# Patient Record
Sex: MALE | Race: WHITE | NOT HISPANIC OR LATINO | Employment: OTHER | URBAN - METROPOLITAN AREA
[De-identification: names, ages, dates, MRNs, and addresses within clinical notes are randomized per-mention and may not be internally consistent; named-entity substitution may affect disease eponyms.]

---

## 2017-01-04 ENCOUNTER — OFFICE VISIT (OUTPATIENT)
Dept: FAMILY MEDICINE CLINIC | Facility: CLINIC | Age: 82
End: 2017-01-04

## 2017-01-04 VITALS
BODY MASS INDEX: 25.18 KG/M2 | RESPIRATION RATE: 16 BRPM | SYSTOLIC BLOOD PRESSURE: 128 MMHG | DIASTOLIC BLOOD PRESSURE: 80 MMHG | TEMPERATURE: 97.5 F | HEART RATE: 60 BPM | HEIGHT: 66 IN | WEIGHT: 156.7 LBS | OXYGEN SATURATION: 94 %

## 2017-01-04 DIAGNOSIS — J06.9 VIRAL URI: Primary | ICD-10-CM

## 2017-01-04 PROCEDURE — 99213 OFFICE O/P EST LOW 20 MIN: CPT | Performed by: FAMILY MEDICINE

## 2017-01-04 NOTE — PROGRESS NOTES
"Subjective   Ky Michael is a 89 y.o. male.     Chief Complaint   Patient presents with   • Cough        History of Present Illness    3 or 4 days of cold symptoms.  Cough.  A little bothersome.  No fevers.  No chills.  No shortness of breath.  No other symptoms other than some congestion.  Using some over-the-counter remedies.  They are not sure if he's previously had a pneumonia vaccination.  Mild to moderate symptoms.      The following portions of the patient's history were reviewed and updated as appropriate: allergies, current medications, past family history, past medical history, past social history, past surgical history and problem list.          Review of Systems   Constitutional: Negative for fatigue and fever.   HENT: Positive for congestion.    Respiratory: Positive for cough.    Gastrointestinal: Negative.    Skin: Negative for rash.       Objective   Blood pressure 128/80, pulse 60, temperature 97.5 °F (36.4 °C), temperature source Oral, resp. rate 16, height 66\" (167.6 cm), weight 156 lb 11.2 oz (71.1 kg), SpO2 94 %.  Physical Exam   Constitutional: No distress.   No acute distress.  Nontoxic.   HENT:   Right Ear: Tympanic membrane, external ear and ear canal normal.   Left Ear: Tympanic membrane, external ear and ear canal normal.   Nose: Nose normal.   Mouth/Throat: Oropharynx is clear and moist. No oropharyngeal exudate.   Eyes: Conjunctivae are normal. Right eye exhibits no discharge. Left eye exhibits no discharge. No scleral icterus.   Cardiovascular: Normal rate.    Pulmonary/Chest: Effort normal and breath sounds normal. No stridor. No respiratory distress. He has no wheezes. He has no rales.   No tachypnea   Lymphadenopathy:     He has no cervical adenopathy.   Skin: No rash noted.   Nursing note and vitals reviewed.      Assessment/Plan   Ky was seen today for cough.    Diagnoses and all orders for this visit:    Viral URI    Very probable viral URI.  Recommend over-the-counter " Robitussin, plain.  I am concerned about risk of pneumonia given his age.  That said no current clinical evidence of pneumonia.  I did give the family a prescription for a Z-Alphonse, handwritten prescription.  Start with fever, worsening symptoms, reactive cough, and then let us know.  With severe symptoms seek medical attention immediately.  I recommend he return for wellness visit in the near future and be evaluated for immunization status.

## 2017-01-04 NOTE — MR AVS SNAPSHOT
Ky Michael   2017 1:00 PM   Office Visit    Dept Phone:  [phone]   Encounter #:  [id number]    Provider:  Jean Paul Estrada MD   Department:  [facility] PRIMARY CARE                Your Full Care Plan              Your Updated Medication List          This list is accurate as of: 17  2:39 PM.  Always use your most recent med list.                metoprolol succinate XL 25 MG 24 hr tablet   Commonly known as:  TOPROL-XL   Take 1 tablet by mouth Daily.       temazepam 30 MG capsule   Commonly known as:  RESTORIL   TAKE 1 CAPSULE BY MOUTH AT BEDTIME AS NEEDED FOR SLEEP               You Were Diagnosed With        Codes Comments    Viral URI    -  Primary ICD-10-CM: J06.9, B97.89  ICD-9-CM: 465.9       Instructions     None    Patient Instructions History      Upcoming Appointments     Visit Type Date Time Department    ACUTE           2017  1:00 PM [facility]    FOLLOW UP 3/14/2017 10:20 AM [facility]      [employer] Signup     [facility] [employer] allows you to send messages to your doctor, view your test results, renew your prescriptions, schedule appointments, and more. To sign up, go to [employer] and click on the Sign Up Now link in the New User? box. Enter your [employer] Activation Code exactly as it appears below along with the last four digits of your Social Security Number and your Date of Birth () to complete the sign-up process. If you do not sign up before the expiration date, you must request a new code.    [employer] Activation Code: [email or web address]  Expires: 2017  2:39 PM    If you have questions, you can email [email or web address] or call [phone] to talk to our [employer] staff. Remember, [employer] is NOT to be used for urgent needs. For medical emergencies, dial 911.               Other Info from Your Visit           Your Appointments     Mar 14, 2017 10:20 AM EDT   Follow Up with Court Masterson MD    "  [facility] CARDIOLOGY (--)    [address]  [facility] [address] [phone]           Arrive 15 minutes prior to appointment.              Allergies     No Known Allergies      Reason for Visit     Cough           Vital Signs     Blood Pressure Pulse Temperature Respirations Height Weight    128/80 60 97.5 °F (36.4 °C) (Oral) 16 66\" (167.6 cm) 156 lb 11.2 oz (71.1 kg)    Oxygen Saturation Body Mass Index Smoking Status             94% 25.29 kg/m2 Former Smoker         Problems and Diagnoses Noted     Viral upper respiratory tract infection    -  Primary        "

## 2017-03-14 ENCOUNTER — TELEPHONE (OUTPATIENT)
Dept: CARDIOLOGY | Facility: CLINIC | Age: 82
End: 2017-03-14

## 2017-03-14 ENCOUNTER — OFFICE VISIT (OUTPATIENT)
Dept: CARDIOLOGY | Facility: CLINIC | Age: 82
End: 2017-03-14

## 2017-03-14 VITALS
WEIGHT: 152 LBS | BODY MASS INDEX: 24.43 KG/M2 | HEIGHT: 66 IN | DIASTOLIC BLOOD PRESSURE: 80 MMHG | SYSTOLIC BLOOD PRESSURE: 170 MMHG | HEART RATE: 70 BPM

## 2017-03-14 DIAGNOSIS — I25.10 CORONARY ARTERY DISEASE INVOLVING NATIVE CORONARY ARTERY OF NATIVE HEART WITHOUT ANGINA PECTORIS: ICD-10-CM

## 2017-03-14 DIAGNOSIS — I10 ESSENTIAL HYPERTENSION: Primary | ICD-10-CM

## 2017-03-14 DIAGNOSIS — I35.0 NONRHEUMATIC AORTIC VALVE STENOSIS: ICD-10-CM

## 2017-03-14 PROCEDURE — 99214 OFFICE O/P EST MOD 30 MIN: CPT | Performed by: INTERNAL MEDICINE

## 2017-03-14 PROCEDURE — 93000 ELECTROCARDIOGRAM COMPLETE: CPT | Performed by: INTERNAL MEDICINE

## 2017-03-14 NOTE — PROGRESS NOTES
Date of Office Visit: 2017  Encounter Provider: Court Masterson MD  Place of Service: [facility] CARDIOLOGY  Patient Name: Ky Michael  :1925    Chief complaint  Follow-up of coronary artery disease, aortic valve stenosis    History of Present Illness  Patient is a 89-year-old gentleman with history of mild aortic valve stenosis is moderate pulmonary hypertension and coronary artery disease.  In  he presented with severe chest pain both atypical and anginal a stress perfusion study was abnormal.  Cardiac catheterization revealed severe disease of the right coronary artery for which he received drug coated stenting.  This is at the left main with normal fractional flow reserve and this was untreated.  He has continued to have intermittent atypical chest pain but no anginal pain since then.  He also has mild aortic valve stenosis.  His last echocardiogram performed revealed normal systolic function, grade 1A diastolic dysfunction aortic valve thickening with mild borderline stenosis with a mean gradient of 13 mmHg valve area of 1.4 cm².  His RV systolic pressure was normal.  2016 he was seen for episodic weakness.  He had a 24-hour Holter revealed sinus rhythm with frequent PACs and PVCs.  He had a Lexiscan perfusion study that was negative for ischemia.  Toprol was increased    Since last visit he feels well and denies any chest pain, palpitations syncope near syncope.  His chronic dyspnea on exertion is unchanged.  There have not been checking blood pressure consistently.    Past Medical History   Diagnosis Date   • Acute sinusitis    • Allergic    • Allergic rhinitis    • Anemia    • Aortic valve stenosis    • ASCVD (arteriosclerotic cardiovascular disease)    • Atypical chest pain    • Bronchospasm    • Bronchospasm    • Cervical neuritis    • Chest pain    • Chronic laryngitis    • Conductive hearing loss of both ears    • Cough    • Depression    •  Diastolic dysfunction    • Dry skin    • Dyslipidemia    • Dysphonia    • Folliculitis of nose    • GERD (gastroesophageal reflux disease)    • Insomnia    • Insomnia    • Left ventricular hypertrophy    • Lumbar radiculopathy    • Mitral valve insufficiency    • Muscular aches    • Pulmonary fibrosis    • Pulmonary hypertension    • SVT (supraventricular tachycardia)    • Vasomotor rhinitis    • Viral bronchitis      Past Surgical History   Procedure Laterality Date   • Cardiac catheterization     • Knee arthroplasty     • Other surgical history       rectal surg   • Cholecystectomy     • Coronary stent placement       Outpatient Medications Prior to Visit   Medication Sig Dispense Refill   • metoprolol succinate XL (TOPROL-XL) 25 MG 24 hr tablet Take 1 tablet by mouth Daily. 30 tablet 11   • temazepam (RESTORIL) 30 MG capsule TAKE 1 CAPSULE BY MOUTH AT BEDTIME AS NEEDED FOR SLEEP 30 capsule 0     No facility-administered medications prior to visit.      Allergies as of 03/14/2017   • (No Known Allergies)     Social History     Social History   • Marital status:      Spouse name: N/A   • Number of children: N/A   • Years of education: N/A     Occupational History   • Not on file.     Social History Main Topics   • Smoking status: Former Smoker   • Smokeless tobacco: Never Used      Comment: caffeine use   • Alcohol use 0.6 oz/week     1 Cans of beer per week      Comment: social drinker   • Drug use: No   • Sexual activity: Defer     Other Topics Concern   • Not on file     Social History Narrative     Family History   Problem Relation Age of Onset   • Coronary artery disease Mother    • Stroke Father      Review of Systems   Constitution: Negative for fever, malaise/fatigue, weight gain and weight loss.   HENT: Negative for ear pain, hearing loss, nosebleeds and sore throat.    Eyes: Negative for double vision, pain, vision loss in left eye and vision loss in right eye.   Cardiovascular:        See history  "of present illness.   Respiratory: Negative for cough, shortness of breath, sleep disturbances due to breathing, snoring and wheezing.    Endocrine: Negative for cold intolerance, heat intolerance and polyuria.   Skin: Negative for itching, poor wound healing and rash.   Musculoskeletal: Negative for joint pain, joint swelling and myalgias.   Gastrointestinal: Negative for abdominal pain, diarrhea, hematochezia, nausea and vomiting.   Genitourinary: Negative for hematuria and hesitancy.   Neurological: Negative for numbness, paresthesias and seizures.   Psychiatric/Behavioral: Negative for depression. The patient is not nervous/anxious.      Objective:     Vitals:    03/14/17 1016   BP: 170/80   Pulse: 70   Weight: 152 lb (68.9 kg)   Height: 66\" (167.6 cm)     Body mass index is 24.53 kg/(m^2).    Physical Exam   Constitutional: He is oriented to person, place, and time. He appears well-developed and well-nourished.   HENT:   Head: Normocephalic.   Nose: Nose normal.   Mouth/Throat: Oropharynx is clear and moist.   Eyes: Conjunctivae and EOM are normal. Pupils are equal, round, and reactive to light. Right eye exhibits no discharge. No scleral icterus.   Neck: Normal range of motion. Neck supple. No JVD present. No thyromegaly present.   Cardiovascular: Normal rate, regular rhythm, normal heart sounds and intact distal pulses.  Exam reveals no gallop and no friction rub.    No murmur heard.  Pulses:       Carotid pulses are 2+ on the right side, and 2+ on the left side.       Radial pulses are 2+ on the right side, and 2+ on the left side.        Femoral pulses are 2+ on the right side, and 2+ on the left side.       Popliteal pulses are 2+ on the right side, and 2+ on the left side.        Dorsalis pedis pulses are 2+ on the right side, and 2+ on the left side.        Posterior tibial pulses are 2+ on the right side, and 2+ on the left side.   Pulmonary/Chest: Effort normal. No respiratory distress. He has no " wheezes. He has rales in the right upper field, the right middle field, the right lower field, the left upper field, the left middle field and the left lower field.   Abdominal: Soft. Bowel sounds are normal. He exhibits no distension. There is no hepatosplenomegaly. There is no tenderness. There is no rebound.   Musculoskeletal: Normal range of motion. He exhibits no edema or tenderness.   Neurological: He is alert and oriented to person, place, and time.   Skin: Skin is warm and dry. No rash noted. No erythema.   Psychiatric: He has a normal mood and affect. His behavior is normal. Judgment and thought content normal.   Vitals reviewed.    Lab Review:     ECG 12 Lead  Date/Time: 3/14/2017 8:30 PM  Performed by: CHRISTINE QUINTANILLA  Authorized by: CHRISTINE QUINTANILLA   Comparison: compared with previous ECG   Similar to previous ECG  Rhythm comments: Sinus arrhythmia  Conduction comments: QTc =471 msec  Clinical impression: abnormal ECG          Assessment:       Diagnosis Plan   1. Essential hypertension     2. Coronary artery disease involving native coronary artery of native heart without angina pectoris     3. Nonrheumatic aortic valve stenosis       Plan:       1.  Frequent PACs and PVCs, improved clinically with the increased dose of metoprolol.  2.  Mild aortic valve stenosis  3.  Multivessel coronary artery disease, with no recent anginal symptoms. Patient has deferred asprin use   4.  Hypertension.  His family will have him check his blood pressure more consistently at the local walk-in facility and his grocery store or pharmacy.  6.  Pulmonary fibrosis with pulmonary hypertension  7.  History of paroxysmal supraventricular tachycardia    Coronary Artery Disease  Assessment  • The patient has no angina    Subjective - Objective  • There has been a previous stent procedure using Ky Esteban   Home Medication Instructions KRYSTINA:    Printed on:03/14/17 2033   Medication Information                       metoprolol succinate XL (TOPROL-XL) 25 MG 24 hr tablet  Take 1 tablet by mouth Daily.             temazepam (RESTORIL) 30 MG capsule  TAKE 1 CAPSULE BY MOUTH AT BEDTIME AS NEEDED FOR SLEEP                 EMR Dragon/Transcription disclaimer:   Much of this encounter note is an electronic transcription/translation of spoken language to printed text. The electronic translation of spoken language may permit erroneous, or at times, nonsensical words or phrases to be inadvertently transcribed; Although I have reviewed the note for such errors, some may still exist.

## 2017-03-22 ENCOUNTER — TELEPHONE (OUTPATIENT)
Dept: CARDIOLOGY | Facility: CLINIC | Age: 82
End: 2017-03-22

## 2017-03-22 NOTE — TELEPHONE ENCOUNTER
These are a little high but lower than in office and I suspect will continue to improve as he does more work outside. radha

## 2017-03-22 NOTE — TELEPHONE ENCOUNTER
Pt's daughter (Gwendolyn) called to report his BP Readings.    3/16 150/80  3/17 154/84  3/18 150/80  3/19 150/80  3/20 146/80  3/21 154/80  3/22 148/80

## 2017-05-16 ENCOUNTER — TELEPHONE (OUTPATIENT)
Dept: CARDIOLOGY | Facility: CLINIC | Age: 82
End: 2017-05-16

## 2017-05-17 RX ORDER — METOPROLOL SUCCINATE 50 MG/1
50 TABLET, EXTENDED RELEASE ORAL DAILY
Qty: 30 TABLET | Refills: 11 | Status: SHIPPED | OUTPATIENT
Start: 2017-05-17

## 2017-07-03 RX ORDER — TEMAZEPAM 30 MG/1
30 CAPSULE ORAL NIGHTLY PRN
Qty: 30 CAPSULE | Refills: 1 | Status: SHIPPED | OUTPATIENT
Start: 2017-07-03

## 2017-08-17 ENCOUNTER — TELEPHONE (OUTPATIENT)
Dept: CARDIOLOGY | Facility: CLINIC | Age: 82
End: 2017-08-17

## 2017-09-19 ENCOUNTER — OFFICE VISIT (OUTPATIENT)
Dept: CARDIOLOGY | Facility: CLINIC | Age: 82
End: 2017-09-19

## 2017-09-19 ENCOUNTER — LAB (OUTPATIENT)
Dept: LAB | Facility: HOSPITAL | Age: 82
End: 2017-09-19

## 2017-09-19 ENCOUNTER — TELEPHONE (OUTPATIENT)
Dept: CARDIOLOGY | Facility: CLINIC | Age: 82
End: 2017-09-19

## 2017-09-19 VITALS
DIASTOLIC BLOOD PRESSURE: 78 MMHG | BODY MASS INDEX: 22.98 KG/M2 | SYSTOLIC BLOOD PRESSURE: 158 MMHG | WEIGHT: 143 LBS | HEIGHT: 66 IN

## 2017-09-19 DIAGNOSIS — R06.02 SOB (SHORTNESS OF BREATH): ICD-10-CM

## 2017-09-19 DIAGNOSIS — R09.02 HYPOXIA: ICD-10-CM

## 2017-09-19 DIAGNOSIS — I34.0 NON-RHEUMATIC MITRAL REGURGITATION: ICD-10-CM

## 2017-09-19 DIAGNOSIS — R07.89 ATYPICAL CHEST PAIN: ICD-10-CM

## 2017-09-19 DIAGNOSIS — R06.09 DYSPNEA ON EXERTION: ICD-10-CM

## 2017-09-19 DIAGNOSIS — J84.10 PULMONARY FIBROSIS (HCC): ICD-10-CM

## 2017-09-19 DIAGNOSIS — R06.02 SOB (SHORTNESS OF BREATH): Primary | ICD-10-CM

## 2017-09-19 LAB
ALBUMIN SERPL-MCNC: 4 G/DL (ref 3.5–5.2)
ALBUMIN/GLOB SERPL: 1.2 G/DL
ALP SERPL-CCNC: 73 U/L (ref 39–117)
ALT SERPL W P-5'-P-CCNC: 15 U/L (ref 1–41)
ANION GAP SERPL CALCULATED.3IONS-SCNC: 13.8 MMOL/L
AST SERPL-CCNC: 25 U/L (ref 1–40)
BASOPHILS # BLD AUTO: 0.03 10*3/MM3 (ref 0–0.2)
BASOPHILS NFR BLD AUTO: 0.3 % (ref 0–1.5)
BILIRUB SERPL-MCNC: 1 MG/DL (ref 0.1–1.2)
BUN BLD-MCNC: 10 MG/DL (ref 8–23)
BUN/CREAT SERPL: 10.5 (ref 7–25)
CALCIUM SPEC-SCNC: 9.4 MG/DL (ref 8.2–9.6)
CHLORIDE SERPL-SCNC: 96 MMOL/L (ref 98–107)
CO2 SERPL-SCNC: 25.2 MMOL/L (ref 22–29)
CREAT BLD-MCNC: 0.95 MG/DL (ref 0.76–1.27)
DEPRECATED RDW RBC AUTO: 49.1 FL (ref 37–54)
EOSINOPHIL # BLD AUTO: 0.14 10*3/MM3 (ref 0–0.7)
EOSINOPHIL NFR BLD AUTO: 1.3 % (ref 0.3–6.2)
ERYTHROCYTE [DISTWIDTH] IN BLOOD BY AUTOMATED COUNT: 14.6 % (ref 11.5–14.5)
GFR SERPL CREATININE-BSD FRML MDRD: 74 ML/MIN/1.73
GLOBULIN UR ELPH-MCNC: 3.3 GM/DL
GLUCOSE BLD-MCNC: 121 MG/DL (ref 65–99)
HCT VFR BLD AUTO: 33.6 % (ref 40.4–52.2)
HGB BLD-MCNC: 11.5 G/DL (ref 13.7–17.6)
IMM GRANULOCYTES # BLD: 0.05 10*3/MM3 (ref 0–0.03)
IMM GRANULOCYTES NFR BLD: 0.5 % (ref 0–0.5)
LYMPHOCYTES # BLD AUTO: 1.02 10*3/MM3 (ref 0.9–4.8)
LYMPHOCYTES NFR BLD AUTO: 9.6 % (ref 19.6–45.3)
MCH RBC QN AUTO: 32.1 PG (ref 27–32.7)
MCHC RBC AUTO-ENTMCNC: 34.2 G/DL (ref 32.6–36.4)
MCV RBC AUTO: 93.9 FL (ref 79.8–96.2)
MONOCYTES # BLD AUTO: 0.82 10*3/MM3 (ref 0.2–1.2)
MONOCYTES NFR BLD AUTO: 7.7 % (ref 5–12)
NEUTROPHILS # BLD AUTO: 8.59 10*3/MM3 (ref 1.9–8.1)
NEUTROPHILS NFR BLD AUTO: 80.6 % (ref 42.7–76)
NT-PROBNP SERPL-MCNC: 3835 PG/ML (ref 0–1800)
PLATELET # BLD AUTO: 236 10*3/MM3 (ref 140–500)
PMV BLD AUTO: 9.5 FL (ref 6–12)
POTASSIUM BLD-SCNC: 3.7 MMOL/L (ref 3.5–5.2)
PROT SERPL-MCNC: 7.3 G/DL (ref 6–8.5)
RBC # BLD AUTO: 3.58 10*6/MM3 (ref 4.6–6)
SODIUM BLD-SCNC: 135 MMOL/L (ref 136–145)
TSH SERPL DL<=0.05 MIU/L-ACNC: 2.04 MIU/ML (ref 0.27–4.2)
WBC NRBC COR # BLD: 10.65 10*3/MM3 (ref 4.5–10.7)

## 2017-09-19 PROCEDURE — 84443 ASSAY THYROID STIM HORMONE: CPT

## 2017-09-19 PROCEDURE — 93000 ELECTROCARDIOGRAM COMPLETE: CPT | Performed by: INTERNAL MEDICINE

## 2017-09-19 PROCEDURE — 83880 ASSAY OF NATRIURETIC PEPTIDE: CPT | Performed by: INTERNAL MEDICINE

## 2017-09-19 PROCEDURE — 80053 COMPREHEN METABOLIC PANEL: CPT | Performed by: INTERNAL MEDICINE

## 2017-09-19 PROCEDURE — 36415 COLL VENOUS BLD VENIPUNCTURE: CPT | Performed by: INTERNAL MEDICINE

## 2017-09-19 PROCEDURE — 99214 OFFICE O/P EST MOD 30 MIN: CPT | Performed by: INTERNAL MEDICINE

## 2017-09-19 PROCEDURE — 85025 COMPLETE CBC W/AUTO DIFF WBC: CPT | Performed by: INTERNAL MEDICINE

## 2017-09-19 NOTE — TELEPHONE ENCOUNTER
I have the order and I will fax the info over and write that its urgent and then they will call the pt to schedule

## 2017-09-20 NOTE — PROGRESS NOTES
Date of Office Visit: 2017  Encounter Provider: Court Masterson MD  Place of Service: [facility] CARDIOLOGY  Patient Name: Ky Michael  :1925    Chief complaint  Follow-up of coronary artery disease, aortic valve stenosis and progressive shortness of breath.    History of Present Illness  Patient is a 89-year-old gentleman with history of mild aortic valve stenosis is moderate pulmonary hypertension and coronary artery disease.  In  he presented with severe chest pain both atypical and anginal a stress perfusion study was abnormal.  Cardiac catheterization revealed severe disease of the right coronary artery for which he received drug coated stenting.  This is at the left main with normal fractional flow reserve and this was untreated.  He has continued to have intermittent atypical chest pain but no anginal pain since then.  He also has mild aortic valve stenosis.  His last echocardiogram performed revealed normal systolic function, grade 1A diastolic dysfunction aortic valve thickening with mild borderline stenosis with a mean gradient of 13 mmHg valve area of 1.4 cm² and a moderate to severe mitral regurgitation.  His RV systolic pressure was normal.  2016 he was seen for episodic weakness.  He had a 24-hour Holter revealed sinus rhythm with frequent PACs and PVCs.  He had a Lexiscan perfusion study that was negative for ischemia.  Toprol was increased.    Since last visit he has lost 9 pounds unintentionally.  Patient and son who is with him state with appetite has been less.  His son notes he is more dyspneic with activities.  Patient admits to dyspnea but is uncertain if it is worse.  It is especially worse in the past 2 weeks that he denies any fever chills or cough.  He has had no significant edema.  He feels no chest pain other than his usual atypical brief twinges of chest pain.  He has not had midsternal chest pain.  He denies any palpitations.   His home blood pressure readings have been typically lower in the 130s to 140s systolics.  When he walked into office his O2 saturation had dropped to 86% though recovered promptly.  He has not seen a pulmonologist in a long time per patient and son    Past Medical History:   Diagnosis Date   • Acute sinusitis    • Allergic    • Allergic rhinitis    • Anemia    • Aortic valve stenosis    • ASCVD (arteriosclerotic cardiovascular disease)    • Atypical chest pain    • Bronchospasm    • Bronchospasm    • Cervical neuritis    • Chest pain    • Chronic laryngitis    • Conductive hearing loss of both ears    • Cough    • Depression    • Diastolic dysfunction    • Dry skin    • Dyslipidemia    • Dysphonia    • Folliculitis of nose    • GERD (gastroesophageal reflux disease)    • Insomnia    • Left ventricular hypertrophy    • Lumbar radiculopathy    • Mitral valve insufficiency    • Muscular aches    • Pulmonary fibrosis    • Pulmonary hypertension    • SVT (supraventricular tachycardia)    • Vasomotor rhinitis    • Viral bronchitis      Past Surgical History:   Procedure Laterality Date   • CARDIAC CATHETERIZATION     • CHOLECYSTECTOMY     • CORONARY STENT PLACEMENT     • KNEE ARTHROPLASTY     • OTHER SURGICAL HISTORY      rectal surg     Outpatient Medications Prior to Visit   Medication Sig Dispense Refill   • metoprolol succinate XL (TOPROL-XL) 50 MG 24 hr tablet Take 1 tablet by mouth Daily. 30 tablet 11   • temazepam (RESTORIL) 30 MG capsule Take 1 capsule by mouth At Night As Needed for Sleep. 30 capsule 1     No facility-administered medications prior to visit.      Allergies as of 09/19/2017   • (No Known Allergies)     Social History     Social History   • Marital status:      Spouse name: N/A   • Number of children: N/A   • Years of education: N/A     Occupational History   • Not on file.     Social History Main Topics   • Smoking status: Former Smoker   • Smokeless tobacco: Never Used      Comment:  "caffeine use   • Alcohol use 0.6 oz/week     1 Cans of beer per week      Comment: social drinker   • Drug use: No   • Sexual activity: Defer     Other Topics Concern   • Not on file     Social History Narrative     Family History   Problem Relation Age of Onset   • Coronary artery disease Mother    • Stroke Father      Review of Systems   Constitution: Positive for decreased appetite and malaise/fatigue. Negative for fever, weight gain and weight loss.   HENT: Negative for ear pain, hearing loss, nosebleeds and sore throat.    Eyes: Negative for double vision, pain, vision loss in left eye and vision loss in right eye.   Cardiovascular:        See history of present illness.   Respiratory: Positive for shortness of breath. Negative for cough, sleep disturbances due to breathing, snoring and wheezing.    Endocrine: Negative for cold intolerance, heat intolerance and polyuria.   Skin: Negative for itching, poor wound healing and rash.   Musculoskeletal: Positive for joint pain. Negative for joint swelling and myalgias.   Gastrointestinal: Negative for abdominal pain, diarrhea, hematochezia, nausea and vomiting.   Genitourinary: Negative for hematuria and hesitancy.   Neurological: Negative for numbness, paresthesias and seizures.   Psychiatric/Behavioral: Negative for depression. The patient is not nervous/anxious.      Objective:     Vitals:    09/19/17 1038   BP: 158/78   Weight: 143 lb (64.9 kg)   Height: 66\" (167.6 cm)     Body mass index is 23.08 kg/(m^2).    Physical Exam   Constitutional: He is oriented to person, place, and time. He appears well-developed and well-nourished.   HENT:   Head: Normocephalic.   Nose: Nose normal.   Mouth/Throat: Oropharynx is clear and moist.   Eyes: Conjunctivae and EOM are normal. Pupils are equal, round, and reactive to light. Right eye exhibits no discharge. No scleral icterus.   Neck: Normal range of motion. Neck supple. JVD present. No thyromegaly present.   Cardiovascular: " Normal rate, regular rhythm and intact distal pulses.  Exam reveals no gallop and no friction rub.    Murmur heard.   Harsh midsystolic murmur is present with a grade of 2/6  at the upper right sternal border radiating to the neck  High-pitched blowing holosystolic murmur of grade 3/6 is also present at the apex.  Pulses:       Carotid pulses are 2+ on the right side, and 2+ on the left side.       Radial pulses are 2+ on the right side, and 2+ on the left side.        Femoral pulses are 2+ on the right side, and 2+ on the left side.       Popliteal pulses are 2+ on the right side, and 2+ on the left side.        Dorsalis pedis pulses are 2+ on the right side, and 2+ on the left side.        Posterior tibial pulses are 2+ on the right side, and 2+ on the left side.   Pulmonary/Chest: Effort normal and breath sounds normal. No respiratory distress. He has no wheezes. He has no rales.   Abdominal: Soft. Bowel sounds are normal. He exhibits no distension. There is no hepatosplenomegaly. There is no tenderness. There is no rebound.   Musculoskeletal: Normal range of motion. He exhibits no edema or tenderness.   Neurological: He is alert and oriented to person, place, and time.   Skin: Skin is warm and dry. No rash noted. No erythema.   Psychiatric: He has a normal mood and affect. His behavior is normal. Judgment and thought content normal.   Vitals reviewed.    Lab Review:     ECG 12 Lead  Date/Time: 9/19/2017 8:42 PM  Performed by: CHRISTINE QUINTANILLA  Authorized by: CHRISTINE QUINTANILLA   Comparison: compared with previous ECG   Rhythm: sinus rhythm  Conduction comments: QTc = 476msec  Clinical impression: abnormal ECG          Assessment:       Diagnosis Plan   1. SOB (shortness of breath)  Ambulatory Referral to Pulmonology    Comprehensive Metabolic Panel    CBC & Differential    TSH    Adult Transthoracic Echo Complete W/ Cont if Necessary Per Protocol    CBC Auto Differential    proBNP   2. Pulmonary fibrosis  Ambulatory  Referral to Pulmonology    Comprehensive Metabolic Panel    CBC & Differential    TSH    CBC Auto Differential    proBNP   3. Hypoxia  Ambulatory Referral to Pulmonology    Comprehensive Metabolic Panel    CBC & Differential    TSH    Adult Transthoracic Echo Complete W/ Cont if Necessary Per Protocol    CBC Auto Differential    proBNP   4. Dyspnea on exertion   TSH    Adult Transthoracic Echo Complete W/ Cont if Necessary Per Protocol    proBNP   5. Non-rheumatic mitral regurgitation  Adult Transthoracic Echo Complete W/ Cont if Necessary Per Protocol    proBNP   6. Atypical chest pain  Adult Transthoracic Echo Complete W/ Cont if Necessary Per Protocol    proBNP     Plan:       1.  Progressive weakness and fatigue with hypoxia.  Suspect due to progressive pulmonary disease obviously malignancy is a concern.  I have put in a request for prompt pulmonary evaluation with Dr. Torres whom he has seen in the past.  In addition the patient still and states he will contact Dr. Griffith for further evaluation for weight loss.  Will check a CBC CMP, proBNP and TSH.  2.  Hypoxia with known pulmonary fibrosis and pulmonary hypertension, as above in addition we'll check an echocardiogram  3.  Mitral regurgitation, murmur has worsened will check an echocardiogram especially with progressive dyspnea  4.  Mild aortic valve stenosis  5.  Multivessel coronary artery disease, with no recent anginal symptoms. Patient has deferred asprin use   6.  Hypertension.  His family will have him check his blood pressure more consistently at the local walk-in facility and his grocery store or pharmacy.  7.  Pulmonary fibrosis with pulmonary hypertension  8.  History of paroxysmal supraventricular tachycardia    Coronary Artery Disease  Assessment  • The patient has no angina    Subjective - Objective  • There has been a previous stent procedure using Ky Esteban   Home Medication Instructions KRYSTINA:    Printed on:09/19/17 2042    Medication Information                      metoprolol succinate XL (TOPROL-XL) 50 MG 24 hr tablet  Take 1 tablet by mouth Daily.             temazepam (RESTORIL) 30 MG capsule  Take 1 capsule by mouth At Night As Needed for Sleep.                 No orders of the defined types were placed in this encounter.      Dictated utilizing Dragon dictation

## 2017-09-20 NOTE — TELEPHONE ENCOUNTER
Let pt know blood test shows findings consistent with CHF and start lasix 20 mg a day and KCL 10 meq a day. radha

## 2017-09-21 RX ORDER — POTASSIUM CHLORIDE 750 MG/1
10 TABLET, FILM COATED, EXTENDED RELEASE ORAL DAILY
Qty: 30 TABLET | Refills: 5 | Status: SHIPPED | OUTPATIENT
Start: 2017-09-21

## 2017-09-21 RX ORDER — FUROSEMIDE 20 MG/1
20 TABLET ORAL DAILY
Qty: 30 TABLET | Refills: 5 | Status: SHIPPED | OUTPATIENT
Start: 2017-09-21

## 2017-10-02 ENCOUNTER — TRANSCRIBE ORDERS (OUTPATIENT)
Dept: PULMONOLOGY | Facility: HOSPITAL | Age: 82
End: 2017-10-02

## 2017-10-02 DIAGNOSIS — J84.10 PULMONARY FIBROSIS (HCC): Primary | ICD-10-CM

## 2017-10-10 ENCOUNTER — HOSPITAL ENCOUNTER (OUTPATIENT)
Dept: CARDIOLOGY | Facility: HOSPITAL | Age: 82
Discharge: HOME | End: 2017-10-10
Attending: INTERNAL MEDICINE | Admitting: INTERNAL MEDICINE

## 2017-10-10 VITALS — BODY MASS INDEX: 22.98 KG/M2 | HEIGHT: 66 IN | WEIGHT: 143 LBS

## 2017-10-10 DIAGNOSIS — R06.02 SOB (SHORTNESS OF BREATH): ICD-10-CM

## 2017-10-10 DIAGNOSIS — R07.89 ATYPICAL CHEST PAIN: ICD-10-CM

## 2017-10-10 DIAGNOSIS — R06.09 DYSPNEA ON EXERTION: ICD-10-CM

## 2017-10-10 DIAGNOSIS — R09.02 HYPOXIA: ICD-10-CM

## 2017-10-10 DIAGNOSIS — I34.0 NON-RHEUMATIC MITRAL REGURGITATION: ICD-10-CM

## 2017-10-10 LAB
ASCENDING AORTA: 4.1 CM
BH CV ECHO MEAS - ACS: 1.4 CM
BH CV ECHO MEAS - AO MAX PG (FULL): 11.3 MMHG
BH CV ECHO MEAS - AO MAX PG: 12.7 MMHG
BH CV ECHO MEAS - AO MEAN PG (FULL): 5.5 MMHG
BH CV ECHO MEAS - AO MEAN PG: 6.2 MMHG
BH CV ECHO MEAS - AO ROOT AREA (BSA CORRECTED): 2.2
BH CV ECHO MEAS - AO ROOT AREA: 11 CM^2
BH CV ECHO MEAS - AO ROOT DIAM: 3.7 CM
BH CV ECHO MEAS - AO V2 MAX: 178 CM/SEC
BH CV ECHO MEAS - AO V2 MEAN: 108.3 CM/SEC
BH CV ECHO MEAS - AO V2 VTI: 34.3 CM
BH CV ECHO MEAS - ASC AORTA: 4.1 CM
BH CV ECHO MEAS - AVA(I,A): 1.2 CM^2
BH CV ECHO MEAS - AVA(I,D): 1.2 CM^2
BH CV ECHO MEAS - AVA(V,A): 0.96 CM^2
BH CV ECHO MEAS - AVA(V,D): 0.96 CM^2
BH CV ECHO MEAS - BSA(HAYCOCK): 1.7 M^2
BH CV ECHO MEAS - BSA: 1.7 M^2
BH CV ECHO MEAS - BZI_BMI: 23.1 KILOGRAMS/M^2
BH CV ECHO MEAS - BZI_METRIC_HEIGHT: 167.6 CM
BH CV ECHO MEAS - BZI_METRIC_WEIGHT: 64.9 KG
BH CV ECHO MEAS - CONTRAST EF (2CH): 55.3 ML/M^2
BH CV ECHO MEAS - CONTRAST EF 4CH: 58.1 ML/M^2
BH CV ECHO MEAS - EDV(MOD-SP2): 76 ML
BH CV ECHO MEAS - EDV(MOD-SP4): 86 ML
BH CV ECHO MEAS - EDV(TEICH): 124.4 ML
BH CV ECHO MEAS - EF(CUBED): 75.4 %
BH CV ECHO MEAS - EF(MOD-SP2): 55.3 %
BH CV ECHO MEAS - EF(MOD-SP4): 58.1 %
BH CV ECHO MEAS - EF(TEICH): 67 %
BH CV ECHO MEAS - ESV(MOD-SP2): 34 ML
BH CV ECHO MEAS - ESV(MOD-SP4): 36 ML
BH CV ECHO MEAS - ESV(TEICH): 41.1 ML
BH CV ECHO MEAS - FS: 37.3 %
BH CV ECHO MEAS - IVS/LVPW: 1
BH CV ECHO MEAS - IVSD: 1.1 CM
BH CV ECHO MEAS - LAT PEAK E' VEL: 8 CM/SEC
BH CV ECHO MEAS - LV DIASTOLIC VOL/BSA (35-75): 49.6 ML/M^2
BH CV ECHO MEAS - LV MASS(C)D: 221.4 GRAMS
BH CV ECHO MEAS - LV MASS(C)DI: 127.6 GRAMS/M^2
BH CV ECHO MEAS - LV MAX PG: 1.3 MMHG
BH CV ECHO MEAS - LV MEAN PG: 0.75 MMHG
BH CV ECHO MEAS - LV SYSTOLIC VOL/BSA (12-30): 20.8 ML/M^2
BH CV ECHO MEAS - LV V1 MAX: 57.6 CM/SEC
BH CV ECHO MEAS - LV V1 MEAN: 40.1 CM/SEC
BH CV ECHO MEAS - LV V1 VTI: 14.1 CM
BH CV ECHO MEAS - LVIDD: 5.1 CM
BH CV ECHO MEAS - LVIDS: 3.2 CM
BH CV ECHO MEAS - LVLD AP2: 6.5 CM
BH CV ECHO MEAS - LVLD AP4: 6.8 CM
BH CV ECHO MEAS - LVLS AP2: 6.2 CM
BH CV ECHO MEAS - LVLS AP4: 5.8 CM
BH CV ECHO MEAS - LVOT AREA (M): 2.8 CM^2
BH CV ECHO MEAS - LVOT AREA: 3 CM^2
BH CV ECHO MEAS - LVOT DIAM: 1.9 CM
BH CV ECHO MEAS - LVPWD: 1.1 CM
BH CV ECHO MEAS - MED PEAK E' VEL: 6 CM/SEC
BH CV ECHO MEAS - MR MAX PG: 127.8 MMHG
BH CV ECHO MEAS - MR MAX VEL: 565.2 CM/SEC
BH CV ECHO MEAS - MR MEAN PG: 87 MMHG
BH CV ECHO MEAS - MR MEAN VEL: 442.6 CM/SEC
BH CV ECHO MEAS - MR VTI: 188.6 CM
BH CV ECHO MEAS - MV A DUR: 0.09 SEC
BH CV ECHO MEAS - MV A MAX VEL: 106.9 CM/SEC
BH CV ECHO MEAS - MV DEC SLOPE: 523.7 CM/SEC^2
BH CV ECHO MEAS - MV DEC TIME: 0.24 SEC
BH CV ECHO MEAS - MV E MAX VEL: 115.7 CM/SEC
BH CV ECHO MEAS - MV E/A: 1.1
BH CV ECHO MEAS - MV MAX PG: 8.1 MMHG
BH CV ECHO MEAS - MV MEAN PG: 2.6 MMHG
BH CV ECHO MEAS - MV P1/2T MAX VEL: 131 CM/SEC
BH CV ECHO MEAS - MV P1/2T: 73.3 MSEC
BH CV ECHO MEAS - MV V2 MAX: 142.5 CM/SEC
BH CV ECHO MEAS - MV V2 MEAN: 72.1 CM/SEC
BH CV ECHO MEAS - MV V2 VTI: 50.3 CM
BH CV ECHO MEAS - MVA P1/2T LCG: 1.7 CM^2
BH CV ECHO MEAS - MVA(P1/2T): 3 CM^2
BH CV ECHO MEAS - MVA(VTI): 0.84 CM^2
BH CV ECHO MEAS - PA MAX PG (FULL): 1.2 MMHG
BH CV ECHO MEAS - PA MAX PG: 2.1 MMHG
BH CV ECHO MEAS - PA V2 MAX: 71.9 CM/SEC
BH CV ECHO MEAS - PULM A REVS DUR: 0.11 SEC
BH CV ECHO MEAS - PULM A REVS VEL: 25.2 CM/SEC
BH CV ECHO MEAS - PULM DIAS VEL: 39.1 CM/SEC
BH CV ECHO MEAS - PULM S/D: 1.4
BH CV ECHO MEAS - PULM SYS VEL: 53.9 CM/SEC
BH CV ECHO MEAS - PVA(V,A): 2.1 CM^2
BH CV ECHO MEAS - PVA(V,D): 2.1 CM^2
BH CV ECHO MEAS - QP/QS: 0.8
BH CV ECHO MEAS - RAP SYSTOLE: 8 MMHG
BH CV ECHO MEAS - RV MAX PG: 0.87 MMHG
BH CV ECHO MEAS - RV MEAN PG: 0.4 MMHG
BH CV ECHO MEAS - RV V1 MAX: 46.6 CM/SEC
BH CV ECHO MEAS - RV V1 MEAN: 27.8 CM/SEC
BH CV ECHO MEAS - RV V1 VTI: 10.7 CM
BH CV ECHO MEAS - RVOT AREA: 3.2 CM^2
BH CV ECHO MEAS - RVOT DIAM: 2 CM
BH CV ECHO MEAS - RVSP: 46.2 MMHG
BH CV ECHO MEAS - SI(AO): 218 ML/M^2
BH CV ECHO MEAS - SI(CUBED): 58 ML/M^2
BH CV ECHO MEAS - SI(LVOT): 24.3 ML/M^2
BH CV ECHO MEAS - SI(MOD-SP2): 24.2 ML/M^2
BH CV ECHO MEAS - SI(MOD-SP4): 28.8 ML/M^2
BH CV ECHO MEAS - SI(TEICH): 48.1 ML/M^2
BH CV ECHO MEAS - SUP REN AO DIAM: 1.5 CM
BH CV ECHO MEAS - SV(AO): 378.1 ML
BH CV ECHO MEAS - SV(CUBED): 100.6 ML
BH CV ECHO MEAS - SV(LVOT): 42.1 ML
BH CV ECHO MEAS - SV(MOD-SP2): 42 ML
BH CV ECHO MEAS - SV(MOD-SP4): 50 ML
BH CV ECHO MEAS - SV(RVOT): 33.8 ML
BH CV ECHO MEAS - SV(TEICH): 83.3 ML
BH CV ECHO MEAS - TAPSE (>1.6): 2 CM2
BH CV ECHO MEAS - TR MAX VEL: 309.2 CM/SEC
BH CV XLRA - RV BASE: 2.9 CM
BH CV XLRA - TDI S': 10 CM/SEC
E/E' RATIO: 16.5
LEFT ATRIUM VOLUME INDEX: 33 ML/M2
LV EF 2D ECHO EST: 58 %
SINUS: 3.7 CM
STJ: 3.3 CM

## 2017-10-10 PROCEDURE — 93306 TTE W/DOPPLER COMPLETE: CPT

## 2017-10-10 PROCEDURE — 93306 TTE W/DOPPLER COMPLETE: CPT | Performed by: INTERNAL MEDICINE

## 2017-10-11 ENCOUNTER — HOSPITAL ENCOUNTER (OUTPATIENT)
Dept: CT IMAGING | Facility: HOSPITAL | Age: 82
Discharge: HOME | End: 2017-10-11
Attending: INTERNAL MEDICINE | Admitting: INTERNAL MEDICINE

## 2017-10-11 DIAGNOSIS — J84.10 PULMONARY FIBROSIS (HCC): ICD-10-CM

## 2017-10-11 PROCEDURE — 71250 CT THORAX DX C-: CPT

## 2017-10-15 ENCOUNTER — TELEPHONE (OUTPATIENT)
Dept: CARDIOLOGY | Facility: CLINIC | Age: 82
End: 2017-10-15

## 2017-10-20 NOTE — TELEPHONE ENCOUNTER
I talked to patient and patient's son, regarding test results.  Pulmonary hypertension likely due to idiopathic pulmonary fibrosis.  We'll plan on follow-up in 6 months at which time will check either repeat echocardiogram or CT imaging. radha

## 2017-12-18 ENCOUNTER — TELEPHONE (OUTPATIENT)
Dept: CARDIOLOGY | Facility: CLINIC | Age: 82
End: 2017-12-18

## 2017-12-18 NOTE — TELEPHONE ENCOUNTER
Dr. Masterson,    Pt's daughter called in this afternoon to inform you that hthe patient passed away recently.    Williams